# Patient Record
Sex: MALE | Race: WHITE | NOT HISPANIC OR LATINO | URBAN - METROPOLITAN AREA
[De-identification: names, ages, dates, MRNs, and addresses within clinical notes are randomized per-mention and may not be internally consistent; named-entity substitution may affect disease eponyms.]

---

## 2017-01-16 ENCOUNTER — IMPORTED ENCOUNTER (OUTPATIENT)
Dept: URBAN - METROPOLITAN AREA CLINIC 50 | Facility: CLINIC | Age: 82
End: 2017-01-16

## 2017-01-27 ENCOUNTER — IMPORTED ENCOUNTER (OUTPATIENT)
Dept: URBAN - METROPOLITAN AREA CLINIC 50 | Facility: CLINIC | Age: 82
End: 2017-01-27

## 2017-07-10 ENCOUNTER — IMPORTED ENCOUNTER (OUTPATIENT)
Dept: URBAN - METROPOLITAN AREA CLINIC 50 | Facility: CLINIC | Age: 82
End: 2017-07-10

## 2018-01-08 ENCOUNTER — IMPORTED ENCOUNTER (OUTPATIENT)
Dept: URBAN - METROPOLITAN AREA CLINIC 50 | Facility: CLINIC | Age: 83
End: 2018-01-08

## 2018-07-09 ENCOUNTER — IMPORTED ENCOUNTER (OUTPATIENT)
Dept: URBAN - METROPOLITAN AREA CLINIC 50 | Facility: CLINIC | Age: 83
End: 2018-07-09

## 2018-07-25 ENCOUNTER — IMPORTED ENCOUNTER (OUTPATIENT)
Dept: URBAN - METROPOLITAN AREA CLINIC 50 | Facility: CLINIC | Age: 83
End: 2018-07-25

## 2018-07-25 NOTE — PATIENT DISCUSSION
"""Increase Erythromycin Ointment both eyes twice a week QHS. "" ""Start Warm compresses both eyes twice a day. "" ""Start Lid Scrubs both eyes twice a day.  """

## 2018-09-24 ENCOUNTER — IMPORTED ENCOUNTER (OUTPATIENT)
Dept: URBAN - METROPOLITAN AREA CLINIC 50 | Facility: CLINIC | Age: 83
End: 2018-09-24

## 2018-09-25 ENCOUNTER — IMPORTED ENCOUNTER (OUTPATIENT)
Dept: URBAN - METROPOLITAN AREA CLINIC 50 | Facility: CLINIC | Age: 83
End: 2018-09-25

## 2018-09-25 NOTE — PATIENT DISCUSSION
"""Patient elects for removal. Consent on file. "" ""Start Erythromycin Ointment left eye twice a day. on the affected area"" ""Continue Cool compresses left eye twice a day.  """

## 2021-04-18 ASSESSMENT — TONOMETRY
OS_IOP_MMHG: 14
OS_IOP_MMHG: 16
OD_IOP_MMHG: 18
OD_IOP_MMHG: 16
OD_IOP_MMHG: 16
OS_IOP_MMHG: 16
OD_IOP_MMHG: 14
OS_IOP_MMHG: 16
OD_IOP_MMHG: 14
OS_IOP_MMHG: 19
OS_IOP_MMHG: 16
OD_IOP_MMHG: 16
OS_IOP_MMHG: 14
OS_IOP_MMHG: 16

## 2021-04-18 ASSESSMENT — VISUAL ACUITY
OD_CC: 20/60-
OD_CC: 20/100
OD_CC: 20/100
OD_CC: J5
OD_PH: 20/40-1
OD_CC: 20/100-1
OD_CC: 20/70-2
OD_CC: J2
OS_CC: J1
OD_PH: 20/80
OS_CC: 20/70
OS_CC: 20/80-2
OS_CC: 20/50+1
OS_CC: 20/100-
OS_PH: 20/70
OS_CC: J2
OS_CC: J5
OS_CC: 20/40-1
OD_CC: J1
OS_CC: 20/60+2
OS_PH: 20/50-2
OS_PH: 20/40-1
OD_CC: 20/60-1

## 2021-04-18 ASSESSMENT — PACHYMETRY
OD_CT_UM: 586
OS_CT_UM: 590
OD_CT_UM: 586
OS_CT_UM: 590
OD_CT_UM: 586
OS_CT_UM: 590